# Patient Record
Sex: MALE | Race: ASIAN
[De-identification: names, ages, dates, MRNs, and addresses within clinical notes are randomized per-mention and may not be internally consistent; named-entity substitution may affect disease eponyms.]

---

## 2017-09-04 ENCOUNTER — HOSPITAL ENCOUNTER (EMERGENCY)
Dept: HOSPITAL 76 - ED | Age: 22
Discharge: HOME | End: 2017-09-04
Payer: MEDICAID

## 2017-09-04 VITALS — SYSTOLIC BLOOD PRESSURE: 142 MMHG | DIASTOLIC BLOOD PRESSURE: 69 MMHG
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
## 2017-09-04 DIAGNOSIS — S06.9X9A: Primary | ICD-10-CM

## 2017-09-04 DIAGNOSIS — S52.502A: ICD-10-CM

## 2017-09-04 PROCEDURE — 29125 APPL SHORT ARM SPLINT STATIC: CPT

## 2017-09-04 PROCEDURE — 99284 EMERGENCY DEPT VISIT MOD MDM: CPT

## 2017-09-04 PROCEDURE — 70450 CT HEAD/BRAIN W/O DYE: CPT

## 2017-09-04 PROCEDURE — 99283 EMERGENCY DEPT VISIT LOW MDM: CPT

## 2017-09-04 NOTE — XRAY REPORT
EXAM:

LEFT WRIST RADIOGRAPHY

 

EXAM DATE: 9/4/2017 05:51 PM.

 

CLINICAL HISTORY: Fall, L wrist pain.

 

COMPARISON: None.

 

TECHNIQUE: 3 views.

 

FINDINGS: 

Bones: Nondisplaced transverse radial metaphyseal fracture. Probable intra-articular involvement at t
he radial styloid process. The distal ulna and carpal bones are intact.

 

Joints: Normal. No subluxations.

 

Soft Tissues: Normal. No soft tissue swelling.

 

IMPRESSION: Nondisplaced, intra-articular  radial metaphyseal fracture.

 

[facility]

Referring Provider Line: [phone]

 

SITE ID: [id number]

## 2017-09-04 NOTE — XRAY PRELIMINARY REPORT
Accession: [id number]

Exam: XR Wrist 4 View LT

 

IMPRESSION: Nondisplaced, intra-articular  radial metaphyseal fracture.

 

[facility]

 

SITE ID: [id number]

## 2017-09-04 NOTE — CT REPORT
EXAM:

CT HEAD

 

EXAM DATE: 9/4/2017 05:58 PM.

 

CLINICAL HISTORY: 21-year-old with fall and head injury resulting in loss of consciousness. For intra
cranial pathology.

 

COMPARISON: None.

 

TECHNIQUE: Multiaxial CT images were obtained from the foramen magnum to the vertex. IV contrast: Non
e. Reformats: Coronal.

 

In accordance with CT protocol optimization, one or more of the following dose reduction techniques w
ere utilized for this exam: automated exposure control, adjustment of mA and/or KV based on patient s
ize, or use of iterative reconstructive technique.

 

FINDINGS:

Parenchyma: No intraparenchymal hemorrhage. No evidence of mass, midline shift, or CT findings of inf
arction. Gray-white differentiation is distinct.

 

Extraaxial Spaces: Normal for age. No subdural or epidural collections identified.

 

Ventricles: Normal in size and position.

 

Sinuses: Imaged paranasal sinuses, orbits, and mastoids show no significant abnormality.

 

Bones: No evidence of fracture or calvarial defect.

 

Other: None.

 

IMPRESSION: 

1. No definite acute infarct, hemorrhage, mass, hydrocephalus.

2. No definite calvarial fracture seen.

 

[facility]

Referring Provider Line: [phone]

 

SITE ID: [id number]

## 2017-09-04 NOTE — CT PRELIMINARY REPORT
Accession: [id number]

Exam: CT Head W/O

 

IMPRESSION: 

1. No definite acute infarct, hemorrhage, mass, hydrocephalus.

2. No definite calvarial fracture seen.

 

[facility]

 

SITE ID: [id number]

## 2018-03-28 ENCOUNTER — HOSPITAL ENCOUNTER (EMERGENCY)
Dept: HOSPITAL 76 - ED | Age: 23
Discharge: HOME | End: 2018-03-28
Payer: MEDICAID

## 2018-03-28 VITALS — SYSTOLIC BLOOD PRESSURE: 121 MMHG | DIASTOLIC BLOOD PRESSURE: 75 MMHG

## 2018-03-28 DIAGNOSIS — J30.2: Primary | ICD-10-CM

## 2018-03-28 PROCEDURE — 87430 STREP A AG IA: CPT

## 2018-03-28 PROCEDURE — 87077 CULTURE AEROBIC IDENTIFY: CPT

## 2018-03-28 PROCEDURE — 99283 EMERGENCY DEPT VISIT LOW MDM: CPT

## 2018-03-28 PROCEDURE — 87070 CULTURE OTHR SPECIMN AEROBIC: CPT

## 2018-03-28 NOTE — ED PHYSICIAN DOCUMENTATION
PD HPI HEENT





- Stated complaint


Stated Complaint: SORE THROAT/STUFFY NOSE





- Chief complaint


Chief Complaint: Heent





- History obtained from


History obtained from: Patient, Family (Mother)





- History of Present Illness


Timing - onset: How many weeks ago (1)


Timing - details: Waxing and waning


Location: Throat


Associated symptoms: Congestion


Similar symptoms before: Has not had sx before





- Additional information


Additional information: 





The patient is a 22-year-old male who complains of sore throat that has been 

waxing and waning for the past week.  He reports associated congestion and 

nonproductive cough.  He denies fever, headache, or gastrointestinal symptoms.  

He denies history of similar symptoms in the past.





Review of Systems


Constitutional: denies: Fever, Myalgias


Eyes: denies: Discharge


Ears: denies: Ear pain


Nose: reports: Congestion


Throat: reports: Sore throat


Cardiac: denies: Chest pain / pressure


Respiratory: reports: Cough.  denies: Dyspnea


GI: denies: Abdominal Pain, Nausea, Vomiting


Skin: denies: Rash


Musculoskeletal: denies: Extremity swelling


Neurologic: denies: Headache





PD PAST MEDICAL HISTORY





- Past Medical History


Past Medical History: Yes


Respiratory: Asthma


Endocrine/Autoimmune: None





- Past Surgical History


Past Surgical History: No





- Present Medications


Home Medications: 


 Ambulatory Orders











 Medication  Instructions  Recorded  Confirmed


 


Cetirizine [ZyrTEC] 10 mg PO DAILY #10 tablet 03/28/18 














- Allergies


Allergies/Adverse Reactions: 


 Allergies











Allergy/AdvReac Type Severity Reaction Status Date / Time


 


Penicillins Allergy  Rash Verified 09/04/17 17:22














- Social History


Does the pt smoke?: No


Smoking Status: Never smoker


Does the pt drink ETOH?: No


Does the pt have substance abuse?: No





- Immunizations


Immunizations are current?: Yes





- POLST


Patient has POLST: No





PD ED PE NORMAL





- Vitals


Vital signs reviewed: Yes (Normal)





- General


General: Alert and oriented X 3, Well developed/nourished





- HEENT


HEENT: Atraumatic, EOMI, Ears normal, Other (Oropharynx is injected appearing, 

without swelling or exudates.)





- Neck


Neck: Supple, no meningeal sign, No adenopathy, No JVD





- Cardiac


Cardiac: RRR, No murmur





- Respiratory


Respiratory: No respiratory distress, Clear bilaterally





- Abdomen


Abdomen: Soft, Non tender





- Back


Back: No CVA TTP





- Derm


Derm: No rash





- Extremities


Extremities: No edema, No calf tenderness / cord





- Neuro


Neuro: Alert and oriented X 3, No motor deficit, Normal speech





Results





- Vitals


Vitals: 


 Vital Signs - 24 hr











  03/28/18





  09:01


 


Temperature 36.7 C


 


Heart Rate 70


 


Respiratory 18





Rate 


 


Blood Pressure 121/75


 


O2 Saturation 100








 Oxygen











O2 Source                      Room air

















- Labs


Labs: 


 Laboratory Tests











  03/28/18





  09:05


 


Group A Strep Rapid  Negative














PD MEDICAL DECISION MAKING





- ED course


Complexity details: reviewed results, considered differential, d/w patient, d/w 

family


ED course: 





The patient's sore throat is likely caused by environmental allergies, although 

viral pharyngitis remains a consideration.  Strep screen is negative.  There is 

no evidence of peritonsillar or retropharyngeal abscess.


Treatment in the emergency department included administration of ibuprofen 800 

mg orally.  He is being discharged with prescription for Zyrtec.  I discussed 

with him and his mother symptomatic treatment and outpatient follow-up, as well 

as potentially worrisome signs or symptoms that should prompt reevaluation in 

the emergency department.





Departure





- Departure


Disposition: 01 Home, Self Care


Clinical Impression: 


 Environmental and seasonal allergies





Condition: Stable


Instructions:  ED Allergy Seasonal


Prescriptions: 


Cetirizine [ZyrTEC] 10 mg PO DAILY #10 tablet


Comments: 


You can use Tylenol or ibuprofen as needed for fever or discomfort.


You can use Zyrtec as prescribed if needed for allergy symptoms.


Follow up with your primary physician within 1-2 weeks.  Call to schedule 

appointment.


Return to the emergency department if you develop increasing difficulty 

swallowing, or otherwise worsening symptoms.